# Patient Record
Sex: FEMALE | HISPANIC OR LATINO
[De-identification: names, ages, dates, MRNs, and addresses within clinical notes are randomized per-mention and may not be internally consistent; named-entity substitution may affect disease eponyms.]

---

## 2017-07-10 PROCEDURE — 82043 UR ALBUMIN QUANTITATIVE: CPT | Performed by: INTERNAL MEDICINE

## 2017-07-10 PROCEDURE — 82570 ASSAY OF URINE CREATININE: CPT | Performed by: INTERNAL MEDICINE

## 2017-07-10 PROCEDURE — 81003 URINALYSIS AUTO W/O SCOPE: CPT | Performed by: INTERNAL MEDICINE

## 2017-07-10 PROCEDURE — 84156 ASSAY OF PROTEIN URINE: CPT | Performed by: INTERNAL MEDICINE

## 2017-07-26 PROBLEM — M51.36 DISCOGENIC LOW BACK PAIN: Status: ACTIVE | Noted: 2017-07-26

## 2017-07-26 PROBLEM — M62.830 LUMBAR PARASPINAL MUSCLE SPASM: Status: ACTIVE | Noted: 2017-07-26

## 2017-07-26 PROBLEM — Z01.89 ENCOUNTER FOR PAIN MANAGEMENT PLANNING: Status: ACTIVE | Noted: 2017-07-26

## 2018-02-19 PROCEDURE — 80307 DRUG TEST PRSMV CHEM ANLYZR: CPT | Performed by: INTERNAL MEDICINE

## 2018-09-04 ENCOUNTER — CHARTING TRANS (OUTPATIENT)
Dept: OTHER | Age: 48
End: 2018-09-04

## 2018-09-04 ENCOUNTER — LAB SERVICES (OUTPATIENT)
Dept: OTHER | Age: 48
End: 2018-09-04

## 2018-09-04 LAB — GLUCOSE: 151

## 2018-11-27 VITALS
DIASTOLIC BLOOD PRESSURE: 88 MMHG | WEIGHT: 153.33 LBS | SYSTOLIC BLOOD PRESSURE: 134 MMHG | BODY MASS INDEX: 25.55 KG/M2 | HEART RATE: 83 BPM | HEIGHT: 65 IN

## 2019-07-02 PROBLEM — R10.13 EPIGASTRIC PAIN: Status: ACTIVE | Noted: 2019-07-02

## 2019-07-02 PROBLEM — R10.11 RIGHT UPPER QUADRANT PAIN: Status: ACTIVE | Noted: 2019-07-02

## 2019-07-02 PROBLEM — K58.1 IRRITABLE BOWEL SYNDROME WITH CONSTIPATION: Status: ACTIVE | Noted: 2019-07-02

## 2020-05-19 PROBLEM — S62.647A CLOSED NONDISPLACED FRACTURE OF PROXIMAL PHALANX OF LEFT LITTLE FINGER, INITIAL ENCOUNTER: Status: ACTIVE | Noted: 2020-05-19

## 2021-07-22 PROBLEM — E11.65 TYPE 2 DIABETES MELLITUS WITH HYPERGLYCEMIA, UNSPECIFIED WHETHER LONG TERM INSULIN USE (HCC): Status: ACTIVE | Noted: 2021-07-22

## 2022-03-16 PROBLEM — E11.65 TYPE 2 DIABETES MELLITUS WITH HYPERGLYCEMIA, WITH LONG-TERM CURRENT USE OF INSULIN (HCC): Status: ACTIVE | Noted: 2021-07-22

## 2022-03-16 PROBLEM — Z79.4 TYPE 2 DIABETES MELLITUS WITH HYPERGLYCEMIA, WITH LONG-TERM CURRENT USE OF INSULIN (HCC): Status: ACTIVE | Noted: 2021-07-22

## 2022-04-26 ENCOUNTER — ORDER TRANSCRIPTION (OUTPATIENT)
Dept: PHYSICAL THERAPY | Age: 52
End: 2022-04-26

## 2022-04-27 ENCOUNTER — APPOINTMENT (OUTPATIENT)
Dept: PHYSICAL THERAPY | Age: 52
End: 2022-04-27
Attending: FAMILY MEDICINE
Payer: MEDICAID

## 2022-05-04 ENCOUNTER — APPOINTMENT (OUTPATIENT)
Dept: PHYSICAL THERAPY | Age: 52
End: 2022-05-04
Attending: FAMILY MEDICINE
Payer: MEDICAID

## 2022-05-04 ENCOUNTER — TELEPHONE (OUTPATIENT)
Dept: PHYSICAL THERAPY | Facility: HOSPITAL | Age: 52
End: 2022-05-04

## 2022-05-10 ENCOUNTER — TELEPHONE (OUTPATIENT)
Dept: PHYSICAL THERAPY | Facility: HOSPITAL | Age: 52
End: 2022-05-10

## 2022-05-11 ENCOUNTER — TELEPHONE (OUTPATIENT)
Dept: PHYSICAL THERAPY | Facility: HOSPITAL | Age: 52
End: 2022-05-11

## 2022-05-11 ENCOUNTER — OFFICE VISIT (OUTPATIENT)
Dept: PHYSICAL THERAPY | Age: 52
End: 2022-05-11
Attending: FAMILY MEDICINE
Payer: COMMERCIAL

## 2022-05-11 PROCEDURE — 97162 PT EVAL MOD COMPLEX 30 MIN: CPT

## 2022-05-16 ENCOUNTER — OFFICE VISIT (OUTPATIENT)
Dept: PHYSICAL THERAPY | Age: 52
End: 2022-05-16
Attending: FAMILY MEDICINE
Payer: COMMERCIAL

## 2022-05-16 PROCEDURE — 97110 THERAPEUTIC EXERCISES: CPT

## 2022-05-16 PROCEDURE — 97014 ELECTRIC STIMULATION THERAPY: CPT

## 2022-05-16 NOTE — PROGRESS NOTES
Dx: Chronic pain of both knees (M25.561,M25.562,G89.29)           Authorized # of Visits:  8  Fall Risk: standard         Precautions: n/a             Subjective: The patient reports her knee is hurting all the time  Current Pain Ratin/10  Objective:   See flow sheet    Assessment:   The patient tolerated treatment well with decreased complaints of pain noted after ice and IFC    Plan:   Continue PT to address soft tissue and joint restrictions and provide instruction in a progressive therapeutic exercise program with manual and verbal cueing for proper form and technique    Date: 2022  Tx#:  Date: Tx#: 3/ Date: Tx#: 4/ Date: Tx#: 5/ Date: Tx#: 6/ Date: Tx#: 7/ Date: Tx#: 8/   TherEx TherEx TherEx TherEx TherEx TherEx TherEx   Nustep L4 x 10 min UE/LE         1/2 long sitting quad set 2 x 10 with 5 sec holds         Reviewed HEP         Modalities         IFC and cold pack to left knee x 15 min to decreased pain/swelling         -         -         -         -         HEP: Quad set, heel slides    Charges:  TherEx x 1; ESU x 1       Total Timed Treatment: 15 min  Total Treatment Time: 35 min

## 2022-05-18 ENCOUNTER — APPOINTMENT (OUTPATIENT)
Dept: PHYSICAL THERAPY | Age: 52
End: 2022-05-18
Attending: FAMILY MEDICINE
Payer: MEDICAID

## 2022-05-18 ENCOUNTER — APPOINTMENT (OUTPATIENT)
Dept: PHYSICAL THERAPY | Age: 52
End: 2022-05-18
Attending: FAMILY MEDICINE
Payer: COMMERCIAL

## 2022-05-23 ENCOUNTER — APPOINTMENT (OUTPATIENT)
Dept: PHYSICAL THERAPY | Age: 52
End: 2022-05-23
Attending: FAMILY MEDICINE
Payer: COMMERCIAL

## 2022-05-23 ENCOUNTER — APPOINTMENT (OUTPATIENT)
Dept: PHYSICAL THERAPY | Age: 52
End: 2022-05-23
Attending: FAMILY MEDICINE
Payer: MEDICAID

## 2022-05-25 ENCOUNTER — APPOINTMENT (OUTPATIENT)
Dept: PHYSICAL THERAPY | Age: 52
End: 2022-05-25
Attending: FAMILY MEDICINE
Payer: MEDICAID

## 2022-05-25 ENCOUNTER — TELEPHONE (OUTPATIENT)
Dept: PHYSICAL THERAPY | Facility: HOSPITAL | Age: 52
End: 2022-05-25

## 2022-05-25 ENCOUNTER — APPOINTMENT (OUTPATIENT)
Dept: PHYSICAL THERAPY | Age: 52
End: 2022-05-25
Attending: FAMILY MEDICINE
Payer: COMMERCIAL

## 2022-06-01 ENCOUNTER — OFFICE VISIT (OUTPATIENT)
Dept: PHYSICAL THERAPY | Age: 52
End: 2022-06-01
Attending: FAMILY MEDICINE
Payer: COMMERCIAL

## 2022-06-01 DIAGNOSIS — M25.561 CHRONIC PAIN OF BOTH KNEES: ICD-10-CM

## 2022-06-01 DIAGNOSIS — M25.562 CHRONIC PAIN OF BOTH KNEES: ICD-10-CM

## 2022-06-01 DIAGNOSIS — G89.29 CHRONIC PAIN OF BOTH KNEES: ICD-10-CM

## 2022-06-01 PROCEDURE — 97110 THERAPEUTIC EXERCISES: CPT

## 2022-06-01 PROCEDURE — 97014 ELECTRIC STIMULATION THERAPY: CPT

## 2022-06-01 NOTE — PROGRESS NOTES
Dx: Chronic pain of both knees (M25.561,M25.562,G89.29)           Authorized # of Visits:  8  Fall Risk: standard         Precautions: n/a             Subjective: The patient reports she had a kidney infection and was in the hospital last week. She reports her knee is feeling a little better  Current Pain Rating: 3/10  Objective:   See flow sheet    Assessment:   The patient tolerated treatment well with no significant complaints of knee pain    Plan:   Continue PT to address soft tissue and joint restrictions and provide instruction in a progressive therapeutic exercise program with manual and verbal cueing for proper form and technique    Date: 5/16/2022  Tx#: 2/8 Date:   6/1/2022  Tx#: 3/8 Date: Tx#: 4/ Date: Tx#: 5/ Date: Tx#: 6/ Date: Tx#: 7/ Date: Tx#: 8/   TherEx TherEx TherEx TherEx TherEx TherEx TherEx   Nustep L4 x 10 min UE/LE Nustep L4 x 10 min UE/LE        1/2 long sitting quad set 2 x 10 with 5 sec holds SAQ x 15 with 5 sec holds        Reviewed HEP DKTC with PB 2 x 2 min        Modalities Reviewed HEP        IFC and cold pack to left knee x 15 min to decrease pain/swelling Modalities        - IFC and cold pack to left knee x 15 min to decrease pain/swelling        - -        - -        - -        HEP: Quad set, heel slides, SAQ    Charges:  TherEx x 2; ESU x 1       Total Timed Treatment: 25 min  Total Treatment Time: 45 min

## 2022-06-02 ENCOUNTER — APPOINTMENT (OUTPATIENT)
Dept: PHYSICAL THERAPY | Age: 52
End: 2022-06-02
Attending: FAMILY MEDICINE
Payer: MEDICAID

## 2022-06-06 ENCOUNTER — OFFICE VISIT (OUTPATIENT)
Dept: PHYSICAL THERAPY | Age: 52
End: 2022-06-06
Attending: FAMILY MEDICINE
Payer: COMMERCIAL

## 2022-06-06 DIAGNOSIS — G89.29 CHRONIC PAIN OF BOTH KNEES: ICD-10-CM

## 2022-06-06 DIAGNOSIS — M25.562 CHRONIC PAIN OF BOTH KNEES: ICD-10-CM

## 2022-06-06 DIAGNOSIS — M25.561 CHRONIC PAIN OF BOTH KNEES: ICD-10-CM

## 2022-06-06 PROCEDURE — 97110 THERAPEUTIC EXERCISES: CPT

## 2022-06-06 PROCEDURE — 97014 ELECTRIC STIMULATION THERAPY: CPT

## 2022-06-06 NOTE — PROGRESS NOTES
Dx: Chronic pain of both knees (M25.561,M25.562,G89.29)           Authorized # of Visits:  8  Fall Risk: standard         Precautions: n/a             Subjective: The patient reports she's been trying not to wear the knee brace as much  Current Pain Rating: 3/10  Objective:   See flow sheet    Assessment:   The patient tolerated progression of exercises well with no significant complaints of knee pain    Plan:   Continue PT to address soft tissue and joint restrictions and provide instruction in a progressive therapeutic exercise program with manual and verbal cueing for proper form and technique    Date: 5/16/2022  Tx#: 2/8 Date:   6/1/2022  Tx#: 3/8 Date:   6/6/2022  Tx#: 4/8 Date: Tx#: 5/ Date: Tx#: 6/ Date: Tx#: 7/ Date: Tx#: 8/   TherEx TherEx TherEx TherEx TherEx TherEx TherEx   Nustep L4 x 10 min UE/LE Nustep L4 x 10 min UE/LE Nustep L4 x 10 min UE/LE       1/2 long sitting quad set 2 x 10 with 5 sec holds SAQ x 15 with 5 sec holds Standing HS stretch 3 x 30 sec       Reviewed HEP DKTC with PB 2 x 2 min Standing knee flexion stretch with foot on step       Modalities Reviewed HEP Shuttle Bilateral 2 bands x 15; 3 bands x 15       IFC and cold pack to left knee x 15 min to decrease pain/swelling Modalities Modalities       - IFC and cold pack to left knee x 15 min to decrease pain/swelling IFC and cold pack to left knee x 15 min to decrease pain/swelling       - - -       - - -       - - -       HEP: Quad set, heel slides, SAQ    Charges:  TherEx x 2; ESU x 1       Total Timed Treatment: 25 min  Total Treatment Time: 45 min

## 2022-06-07 ENCOUNTER — APPOINTMENT (OUTPATIENT)
Dept: PHYSICAL THERAPY | Age: 52
End: 2022-06-07
Attending: FAMILY MEDICINE
Payer: MEDICAID

## 2022-06-08 ENCOUNTER — OFFICE VISIT (OUTPATIENT)
Dept: PHYSICAL THERAPY | Age: 52
End: 2022-06-08
Attending: FAMILY MEDICINE
Payer: COMMERCIAL

## 2022-06-08 DIAGNOSIS — M25.562 CHRONIC PAIN OF BOTH KNEES: ICD-10-CM

## 2022-06-08 DIAGNOSIS — G89.29 CHRONIC PAIN OF BOTH KNEES: ICD-10-CM

## 2022-06-08 DIAGNOSIS — M25.561 CHRONIC PAIN OF BOTH KNEES: ICD-10-CM

## 2022-06-08 PROCEDURE — 97014 ELECTRIC STIMULATION THERAPY: CPT

## 2022-06-08 PROCEDURE — 97110 THERAPEUTIC EXERCISES: CPT

## 2022-06-08 NOTE — PROGRESS NOTES
Dx: Chronic pain of both knees (M25.561,M25.562,G89.29)           Authorized # of Visits:  8  Fall Risk: standard         Precautions: n/a             Subjective: The patient reports she's still feeling better overall, but has a little soreness in the knees today  Current Pain Rating: 3/10  Objective:   See flow sheet    Assessment:   The patient tolerated progression of exercises well with no significant complaints of knee pain    Plan:   Continue PT to address soft tissue and joint restrictions and provide instruction in a progressive therapeutic exercise program with manual and verbal cueing for proper form and technique    Date: 5/16/2022  Tx#: 2/8 Date:   6/1/2022  Tx#: 3/8 Date:   6/6/2022  Tx#: 4/8 Date:   6/8/2022  Tx#: 5/8 Date: Tx#: 6/ Date: Tx#: 7/ Date: Tx#: 8/   TherEx TherEx TherEx TherEx TherEx TherEx TherEx   Nustep L4 x 10 min UE/LE Nustep L4 x 10 min UE/LE Nustep L4 x 10 min UE/LE Nustep L4 x 10 min UE/LE      1/2 long sitting quad set 2 x 10 with 5 sec holds SAQ x 15 with 5 sec holds Standing HS stretch 3 x 30 sec SAQ 2 x 15      Reviewed HEP DKTC with PB 2 x 2 min Standing knee flexion stretch with foot on step Supine piriformis stretch 3 x 30 sec      Modalities Reviewed HEP Shuttle Bilateral 2 bands x 15; 3 bands x 15 Standing HS stretch 3 x 30 sec      IFC and cold pack to left knee x 15 min to decrease pain/swelling Modalities Modalities Modalities      - IFC and cold pack to left knee x 15 min to decrease pain/swelling IFC and cold pack to left knee x 15 min to decrease pain/swelling IFC and cold pack to left knee x 15 min to decrease pain/swelling      - - - -      - - - -      - - - -      HEP: Quad set, heel slides, SAQ    Charges:  TherEx x 2; ESU x 1       Total Timed Treatment: 25 min  Total Treatment Time: 45 min

## 2022-06-09 ENCOUNTER — APPOINTMENT (OUTPATIENT)
Dept: PHYSICAL THERAPY | Age: 52
End: 2022-06-09
Attending: FAMILY MEDICINE
Payer: MEDICAID

## 2022-06-13 ENCOUNTER — OFFICE VISIT (OUTPATIENT)
Dept: PHYSICAL THERAPY | Age: 52
End: 2022-06-13
Attending: FAMILY MEDICINE
Payer: COMMERCIAL

## 2022-06-13 DIAGNOSIS — G89.29 CHRONIC PAIN OF BOTH KNEES: ICD-10-CM

## 2022-06-13 DIAGNOSIS — M25.561 CHRONIC PAIN OF BOTH KNEES: ICD-10-CM

## 2022-06-13 DIAGNOSIS — M25.562 CHRONIC PAIN OF BOTH KNEES: ICD-10-CM

## 2022-06-13 PROCEDURE — 97110 THERAPEUTIC EXERCISES: CPT

## 2022-06-14 NOTE — PROGRESS NOTES
Dx: Chronic pain of both knees (M25.561,M25.562,G89.29)           Authorized # of Visits:  8  Fall Risk: standard         Precautions: n/a             Subjective: The patient reports she was able to do some gardening over the weekend and planted six irasema bushes. She reports she needed help getting up afterwards and has some general soreness in her legs today  Current Pain Rating: 3/10  Objective:   See flow sheet    Assessment:   The patient tolerated exercises well despite reports of soreness. Decreased swelling noted in bilateral knees    Plan:   Progress strength/stability exercises as tolerated    Date: 5/16/2022  Tx#: 2/8 Date:   6/1/2022  Tx#: 3/8 Date:   6/6/2022  Tx#: 4/8 Date:   6/8/2022  Tx#: 5/8 Date:   6/13/2022  Tx#: 6/8 Date: Tx#: 7/ Date: Tx#: 8/   TherEx TherEx TherEx TherEx TherEx TherEx TherEx   Nustep L4 x 10 min UE/LE Nustep L4 x 10 min UE/LE Nustep L4 x 10 min UE/LE Nustep L4 x 10 min UE/LE Nustep L4 x 10 min UE/LE     1/2 long sitting quad set 2 x 10 with 5 sec holds SAQ x 15 with 5 sec holds Standing HS stretch 3 x 30 sec SAQ 2 x 15 SAQ 2 x 15     Reviewed HEP DKTC with PB 2 x 2 min Standing knee flexion stretch with foot on step Supine piriformis stretch 3 x 30 sec Supine piriformis stretch 3 x 30 sec     Modalities Reviewed HEP Shuttle Bilateral 2 bands x 15; 3 bands x 15 Standing HS stretch 3 x 30 sec Standing hip flexor and HS stretches 3 x 30 sec each     IFC and cold pack to left knee x 15 min to decrease pain/swelling Modalities Modalities Modalities Supine active HS stretch 2 x 10 R/L with 5 sec holds     - IFC and cold pack to left knee x 15 min to decrease pain/swelling IFC and cold pack to left knee x 15 min to decrease pain/swelling IFC and cold pack to left knee x 15 min to decrease pain/swelling PB DKTC 2 x 2 min     - - - - H/L clams red band 2 x 15     - - - - Reviewed HEP     - - - - -     HEP: Quad set, heel slides, SAQ    Charges:  TherEx x 2         Total Timed Treatment: 25 min  Total Treatment Time: 40 min

## 2022-06-15 ENCOUNTER — OFFICE VISIT (OUTPATIENT)
Dept: PHYSICAL THERAPY | Age: 52
End: 2022-06-15
Attending: FAMILY MEDICINE
Payer: COMMERCIAL

## 2022-06-15 DIAGNOSIS — G89.29 CHRONIC PAIN OF BOTH KNEES: ICD-10-CM

## 2022-06-15 DIAGNOSIS — M25.562 CHRONIC PAIN OF BOTH KNEES: ICD-10-CM

## 2022-06-15 DIAGNOSIS — M25.561 CHRONIC PAIN OF BOTH KNEES: ICD-10-CM

## 2022-06-15 PROCEDURE — 97110 THERAPEUTIC EXERCISES: CPT

## 2022-11-26 ENCOUNTER — HOSPITAL ENCOUNTER (OUTPATIENT)
Age: 52
Discharge: HOME | End: 2022-11-26
Attending: EMERGENCY MEDICINE
Payer: MEDICAID

## 2022-11-26 VITALS
RESPIRATION RATE: 18 BRPM | DIASTOLIC BLOOD PRESSURE: 90 MMHG | OXYGEN SATURATION: 96 % | HEART RATE: 120 BPM | HEIGHT: 65 IN | TEMPERATURE: 98 F | SYSTOLIC BLOOD PRESSURE: 130 MMHG | BODY MASS INDEX: 25.83 KG/M2 | WEIGHT: 155 LBS

## 2022-11-26 DIAGNOSIS — H20.9 IRITIS: Primary | ICD-10-CM

## 2022-11-26 PROCEDURE — 99203 OFFICE O/P NEW LOW 30 MIN: CPT

## 2022-11-26 PROCEDURE — 99283 EMERGENCY DEPT VISIT LOW MDM: CPT

## 2022-11-26 PROCEDURE — 99213 OFFICE O/P EST LOW 20 MIN: CPT

## 2022-11-26 RX ORDER — CYCLOPENTOLATE HYDROCHLORIDE 10 MG/ML
1 SOLUTION/ DROPS OPHTHALMIC ONCE
Qty: 1 EACH | Refills: 0 | Status: SHIPPED | OUTPATIENT
Start: 2022-11-26 | End: 2022-11-26

## 2022-11-26 RX ORDER — TETRACAINE HYDROCHLORIDE 5 MG/ML
1 SOLUTION OPHTHALMIC ONCE
Status: COMPLETED | OUTPATIENT
Start: 2022-11-26 | End: 2022-11-26

## 2022-11-26 RX ORDER — POLYMYXIN B SULFATE AND TRIMETHOPRIM 1; 10000 MG/ML; [USP'U]/ML
1 SOLUTION OPHTHALMIC
Qty: 10 ML | Refills: 0 | Status: SHIPPED | OUTPATIENT
Start: 2022-11-26 | End: 2022-12-01

## 2022-11-26 RX ORDER — HYDROCODONE BITARTRATE AND ACETAMINOPHEN 5; 325 MG/1; MG/1
1-2 TABLET ORAL EVERY 6 HOURS PRN
Qty: 10 TABLET | Refills: 0 | Status: SHIPPED | OUTPATIENT
Start: 2022-11-26 | End: 2022-12-01

## 2022-11-26 NOTE — DISCHARGE INSTRUCTIONS
Wear dark sunglasses to minimize exposure to light  Place Polytrim eyedrops to the right eye every 3 hours while awake for the next 5 days  Place only 1 drop of Cyclogyl to the right eye  Take Norco 1 tablet every 6 hours as needed for pain  Follow-up with ophthalmology.   Call to make a follow-up appointment

## 2022-11-26 NOTE — ED INITIAL ASSESSMENT (HPI)
Patient presents to IC with 3 days of right eye irritation and photosensitivity. Upper lid painful =does not wear contacts. +DM and bs running in the 200's

## 2023-03-07 PROCEDURE — 3046F HEMOGLOBIN A1C LEVEL >9.0%: CPT | Performed by: NURSE PRACTITIONER

## 2023-07-24 ENCOUNTER — OFFICE VISIT (OUTPATIENT)
Dept: FAMILY MEDICINE CLINIC | Facility: CLINIC | Age: 53
End: 2023-07-24
Payer: MEDICAID

## 2023-07-24 VITALS
BODY MASS INDEX: 25.49 KG/M2 | WEIGHT: 153 LBS | OXYGEN SATURATION: 96 % | HEART RATE: 129 BPM | HEIGHT: 65 IN | SYSTOLIC BLOOD PRESSURE: 132 MMHG | DIASTOLIC BLOOD PRESSURE: 82 MMHG | TEMPERATURE: 99 F | RESPIRATION RATE: 18 BRPM

## 2023-07-24 DIAGNOSIS — R00.0 TACHYCARDIA: ICD-10-CM

## 2023-07-24 DIAGNOSIS — L03.115 CELLULITIS OF RIGHT LOWER EXTREMITY: Primary | ICD-10-CM

## 2023-07-24 PROCEDURE — 87147 CULTURE TYPE IMMUNOLOGIC: CPT | Performed by: NURSE PRACTITIONER

## 2023-07-24 PROCEDURE — 87186 SC STD MICRODIL/AGAR DIL: CPT | Performed by: NURSE PRACTITIONER

## 2023-07-24 PROCEDURE — 87205 SMEAR GRAM STAIN: CPT | Performed by: NURSE PRACTITIONER

## 2023-07-24 PROCEDURE — 3075F SYST BP GE 130 - 139MM HG: CPT | Performed by: NURSE PRACTITIONER

## 2023-07-24 PROCEDURE — 87070 CULTURE OTHR SPECIMN AEROBIC: CPT | Performed by: NURSE PRACTITIONER

## 2023-07-24 PROCEDURE — 3008F BODY MASS INDEX DOCD: CPT | Performed by: NURSE PRACTITIONER

## 2023-07-24 PROCEDURE — 3079F DIAST BP 80-89 MM HG: CPT | Performed by: NURSE PRACTITIONER

## 2023-07-24 PROCEDURE — 99203 OFFICE O/P NEW LOW 30 MIN: CPT | Performed by: NURSE PRACTITIONER

## 2023-07-24 RX ORDER — CARVEDILOL 12.5 MG/1
12.5 TABLET ORAL 2 TIMES DAILY
COMMUNITY
Start: 2023-03-27

## 2023-07-24 RX ORDER — SULFAMETHOXAZOLE AND TRIMETHOPRIM 800; 160 MG/1; MG/1
1 TABLET ORAL 2 TIMES DAILY
Qty: 14 TABLET | Refills: 0 | Status: SHIPPED | OUTPATIENT
Start: 2023-07-24 | End: 2023-07-31

## 2023-07-24 NOTE — PATIENT INSTRUCTIONS
Antibiotic as prescribed  Take your carvedilol as prescribed. Monitor your heart rate and seek follow up if your heart rate continues to be elevated despite taking your medication. Go to the ER for new or worsening symptoms such as fever, chills, or spreading of redness beyond marked area. Follow up with your doctor for wound check in 2 days.

## (undated) DIAGNOSIS — M25.562 CHRONIC PAIN OF BOTH KNEES: Primary | ICD-10-CM

## (undated) DIAGNOSIS — M25.561 CHRONIC PAIN OF BOTH KNEES: Primary | ICD-10-CM

## (undated) DIAGNOSIS — G89.29 CHRONIC PAIN OF BOTH KNEES: Primary | ICD-10-CM